# Patient Record
(demographics unavailable — no encounter records)

---

## 2025-03-04 NOTE — PLAN
[TextEntry] : - continue current medication regimen.  - follow up with Dr. Nicole for ongoing cardiology care.  - we have MICHAEL images; obtain cardiac MRI images and report from [facility]  - obtain cardiac CTA  - return to [facility] clinic after above images have been obtained for a MDT discussion with CT surgeon, Dr. Connelly and Dr. Hicks.   I, Dr. Shiva Hicks, personally performed the evaluation and management (E/M) services for this new patient. That E/M includes conducting the clinically appropriate initial history &/or exam, assessing all conditions, and establishing the plan of care. Today, my KUNAL, noted herewith, was here to observe my evaluation and management service for this patient & follow plan of care established by me going forward.

## 2025-03-04 NOTE — RESULTS/DATA
[TextEntry] : TTE 2/24/25 CONCLUSIONS:  1. Left ventricular cavity is normal in size. Left ventricular wall thickness is normal. Left ventricular systolic function is normal with an ejection fraction of 63 % by Watt's method of disks. There are no regional wall motion abnormalities seen.  2. Normal left ventricular diastolic function, with normal left ventricular filling pressure.  3. Mildly enlarged right ventricular cavity size and normal right ventricular systolic function.  4. Normal left and right atrial size.  5. There is a secundum atrial septal defect with bidirectional, but predominantly left-to-right shunting.  6. No significant valvular disease.  7. No pericardial effusion seen.  8. No prior echocardiogram is available for comparison.

## 2025-03-04 NOTE — PHYSICAL EXAM
[Well Developed] : well developed [Well Nourished] : well nourished [No Acute Distress] : no acute distress [Normal Conjunctiva] : normal conjunctiva [Normal S1, S2] : normal S1, S2 [No Rub] : no rub [No Gallop] : no gallop [Clear Lung Fields] : clear lung fields [Good Air Entry] : good air entry [No Respiratory Distress] : no respiratory distress  [Soft] : abdomen soft [Non Tender] : non-tender [Normal Bowel Sounds] : normal bowel sounds [Normal Gait] : normal gait [No Edema] : no edema [No Cyanosis] : no cyanosis [No Clubbing] : no clubbing [No Rash] : no rash [No Skin Lesions] : no skin lesions [Moves all extremities] : moves all extremities [No Focal Deficits] : no focal deficits [Normal Speech] : normal speech [Alert and Oriented] : alert and oriented [Normal memory] : normal memory [de-identified] : supple

## 2025-03-04 NOTE — REVIEW OF SYSTEMS
[Feeling Fatigued] : feeling fatigued [Joint Pain] : joint pain [Negative] : Heme/Lymph [Headache] : no headache [Fever] : no fever [Chills] : no chills [FreeTextEntry5] : see HPI  [de-identified] : see HPI

## 2025-03-04 NOTE — HISTORY OF PRESENT ILLNESS
[FreeTextEntry1] : Referred by Dr. Gregg Nicole  23M, former smoker, with PMH of anxiety/depression, chronic b/l leg pain/numbness 2/2 trauma (followed by pain management, and secundum ASD referred for consultation of treatment options for secundum ASD.   Denies prior CVA, MI/CAD, bleeding or clotting disorder.   Patient was referred to outpatient cardiologist after he was noted to have murmur on exam at PCP. Patient has been having progressive dyspnea on exertion for the past few years with mild LE edema, L sided sharp non-radiating chest pain at rest (not exertion) and occasional palpitations/dizziness.   TTE 11/1/24: EF 60%, RV moderately dilated, 2 large atrial septal defects with deficient anterior and inferior rims, secundum type with left to right shunt, trace TR/TX.   MICHAEL (done at [place])11/3/2023: secundum ASD w/ left to right shunt, ASD [id number], area 2.7cm2, anterior rim is deficient, inferior rim short 5-6mm. Pt was evaluated by structural heart clinic at [place] (Dr. Elier Pizano) on 2/28/24 where he was deemed to likely require surgical correction given 2 large ASD with deficient anterior and inferior rims. However, pt requested second opinion and thus followed up with Dr. Nicole. After reviewing imaging, pt was referred to structural heart team/Dr. Hicks for ASD repair.   Cardiac Cath 11/14/2024 Normal coronaries.  All pressures are in mmHg. RA 6 RV 48/3/12 PA 29/13; mPAP 20; PA sat 86.8% PCWP 6 LESLEY CO: 10.72 L/min LESLEY CI:6.13 L/min/m2SAT: IVC 91 %. HRA 83.4 %. SVC 81.3%. LRA 90.4% RA86% RV 89.7%. Qp:QS 1.6  11/14/24 labs reviewed:  WBC 7.20 H/H 16.3/48.9 Plt 191 BUN/Cr 13/0.78  Today, patient reports two years ago, he went for a physical and was told that there's something wrong with his heart and cardiology evaluation was recommended. 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 Currently, patient reports feeling well but has had increased fatigability over the course of a year which he attributes to his moods and his back pain. Reports had chest pain with SOB episode 2 months ago, went to the ED with unrevealing work up. Since he continues to have intermittent stabbing chest pain, self-limiting, goes away by itself after 5-10 minutes. Able to walk 45 minutes without issues. Has lower extremity when walking a lot but better in the am. Denies any recent shortness of breath, palpitations, lightheadedness/dizziness or syncope, orthopnea, PND, LE edema, abdominal bloating, fever, chills, night sweats, dysuria, active dental issue, or recent hospitalizations.

## 2025-03-04 NOTE — ASSESSMENT
[FreeTextEntry1] : 23M, former smoker, with PMH of anxiety/depression, chronic b/l leg pain/numbness 2/2 trauma (followed by pain management, and secundum ASD referred for consultation of treatment options for secundum ASD. Dr. Hicks has independently seen and evaluated the patient in this face-to-face encounter and has reviewed patient's history and pertinent clinical data. Patient is clinically stable, NYHA Class I. Echocardiogram today showed normal BiV function, mildly enlarged RV, no significant valvular disease, secundum ASD with bi-directional, but predominantly left to right shunting is noted. Results discuss with patient. The natural history/disease progression and indication to treat secundum ASD as well as treatment options including surgical and percutaneous approaches were discussed with patient at length. Given the presence of RV remodeling, Dr. Hicks recommends treatment of his ASD to prevent progressive RV remodeling. To determine the best treatment recommendation for patient, Dr. Hicks recommends obtaining MICHAEL and cardiac MRI images and reports from [place], as well as obtaining a cardiac CTA to better evaluate the patient's pulmonary vasculature and atrial septal defect. Once these images are obtained, the patient will return to our [facility] clinic for a multidisciplinary team discussion involving CT surgeon, Dr. Connelly, and Dr. Hicks. All questions were addressed.

## 2025-03-04 NOTE — REVIEW OF SYSTEMS
[Feeling Fatigued] : feeling fatigued [Joint Pain] : joint pain [Negative] : Heme/Lymph [Fever] : no fever [Headache] : no headache [Chills] : no chills [FreeTextEntry5] : see HPI  [de-identified] : see HPI

## 2025-03-04 NOTE — PHYSICAL EXAM
[Well Developed] : well developed [Well Nourished] : well nourished [No Acute Distress] : no acute distress [Normal Conjunctiva] : normal conjunctiva [Normal S1, S2] : normal S1, S2 [No Rub] : no rub [No Gallop] : no gallop [Clear Lung Fields] : clear lung fields [Good Air Entry] : good air entry [No Respiratory Distress] : no respiratory distress  [Soft] : abdomen soft [Non Tender] : non-tender [Normal Bowel Sounds] : normal bowel sounds [Normal Gait] : normal gait [No Edema] : no edema [No Cyanosis] : no cyanosis [No Clubbing] : no clubbing [No Rash] : no rash [No Skin Lesions] : no skin lesions [Moves all extremities] : moves all extremities [No Focal Deficits] : no focal deficits [Normal Speech] : normal speech [Alert and Oriented] : alert and oriented [Normal memory] : normal memory [de-identified] : supple

## 2025-03-04 NOTE — HISTORY OF PRESENT ILLNESS
[FreeTextEntry1] : Referred by Dr. Gregg Nicole  23M, former smoker, with PMH of anxiety/depression, chronic b/l leg pain/numbness 2/2 trauma (followed by pain management, and secundum ASD referred for consultation of treatment options for secundum ASD.   Denies prior CVA, MI/CAD, bleeding or clotting disorder.   Patient was referred to outpatient cardiologist after he was noted to have murmur on exam at PCP. Patient has been having progressive dyspnea on exertion for the past few years with mild LE edema, L sided sharp non-radiating chest pain at rest (not exertion) and occasional palpitations/dizziness.   TTE 11/1/24: EF 60%, RV moderately dilated, 2 large atrial septal defects with deficient anterior and inferior rims, secundum type with left to right shunt, trace TR/[place].   MICHAEL (done at [place])11/3/2023: secundum ASD w/ left to right shunt, ASD [id number], area 2.7cm2, anterior rim is deficient, inferior rim short 5-6mm. Pt was evaluated by structural heart clinic at [place] (Dr. Elier Pizano) on 2/28/24 where he was deemed to likely require surgical correction given 2 large ASD with deficient anterior and inferior rims. However, pt requested second opinion and thus followed up with Dr. Nicole. After reviewing imaging, pt was referred to structural heart team/Dr. Hicks for ASD repair.   Cardiac Cath 11/14/2024 Normal coronaries.  All pressures are in mmHg. RA 6 RV 48/3/12 PA 29/13; mPAP 20; PA sat 86.8% PCWP 6 LESLEY CO: 10.72 L/min LESLEY CI:6.13 L/min/m2SAT: IVC 91 %. HRA 83.4 %. SVC 81.3%. LRA 90.4% RA86% RV 89.7%. Qp:QS 1.6  11/14/24 labs reviewed:  WBC 7.20 H/H 16.3/48.9 Plt 191 BUN/Cr 13/0.78  Today, patient reports two years ago, he went for a physical and was told that there's something wrong with his heart and cardiology evaluation was recommended. 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 Currently, patient reports feeling well but has had increased fatigability over the course of a year which he attributes to his moods and his back pain. Reports had chest pain with SOB episode 2 months ago, went to the ED with unrevealing work up. Since he continues to have intermittent stabbing chest pain, self-limiting, goes away by itself after 5-10 minutes. Able to walk 45 minutes without issues. Has lower extremity when walking a lot but better in the am. Denies any recent shortness of breath, palpitations, lightheadedness/dizziness or syncope, orthopnea, PND, LE edema, abdominal bloating, fever, chills, night sweats, dysuria, active dental issue, or recent hospitalizations.

## 2025-03-04 NOTE — ASSESSMENT
[FreeTextEntry1] : 23M, former smoker, with PMH of anxiety/depression, chronic b/l leg pain/numbness 2/2 trauma (followed by pain management, and secundum ASD referred for consultation of treatment options for secundum ASD. Dr. Hicks has independently seen and evaluated the patient in this face-to-face encounter and has reviewed patient's history and pertinent clinical data. Patient is clinically stable, NYHA Class I. Echocardiogram today showed normal BiV function, mildly enlarged RV, no significant valvular disease, secundum ASD with bi-directional, but predominantly left to right shunting is noted. Results discuss with patient. The natural history/disease progression and indication to treat secundum ASD as well as treatment options including surgical and percutaneous approaches were discussed with patient at length. Given the presence of RV remodeling, Dr. Hicks recommends treatment of his ASD to prevent progressive RV remodeling. To determine the best treatment recommendation for patient, Dr. Hicks recommends obtaining MICHAEL and cardiac MRI images and reports from [place], as well as obtaining a cardiac CTA to better evaluate the patient's pulmonary vasculature and atrial septal defect. Once these images are obtained, the patient will return to our [facility] clinic for a multidisciplinary team discussion involving CT surgeon, Dr. Connelly, and Dr. Hicks. All questions were addressed.

## 2025-03-04 NOTE — HISTORY OF PRESENT ILLNESS
[FreeTextEntry1] : Referred by Dr. Gregg Nicole  23M, former smoker, with PMH of anxiety/depression, chronic b/l leg pain/numbness 2/2 trauma (followed by pain management, and secundum ASD referred for consultation of treatment options for secundum ASD.   Denies prior CVA, MI/CAD, bleeding or clotting disorder.   Patient was referred to outpatient cardiologist after he was noted to have murmur on exam at PCP. Patient has been having progressive dyspnea on exertion for the past few years with mild LE edema, L sided sharp non-radiating chest pain at rest (not exertion) and occasional palpitations/dizziness.   TTE 11/1/24: EF 60%, RV moderately dilated, 2 large atrial septal defects with deficient anterior and inferior rims, secundum type with left to right shunt, trace TR/[place].   MICHAEL (done at [place])11/3/2023: secundum ASD w/ left to right shunt, ASD [id number], area 2.7cm2, anterior rim is deficient, inferior rim short 5-6mm. Pt was evaluated by structural heart clinic at [place] (Dr. Elier Pizano) on 2/28/24 where he was deemed to likely require surgical correction given 2 large ASD with deficient anterior and inferior rims. However, pt requested second opinion and thus followed up with Dr. Nicole. After reviewing imaging, pt was referred to structural heart team/Dr. Hicks for ASD repair.   Cardiac Cath 11/14/2024 Normal coronaries.  All pressures are in mmHg. RA 6 RV 48/3/12 PA 29/13; mPAP 20; PA sat 86.8% PCWP 6 LESLEY CO: 10.72 L/min LESLEY CI:6.13 L/min/m2SAT: IVC 91 %. HRA 83.4 %. SVC 81.3%. LRA 90.4% RA86% RV 89.7%. Qp:QS 1.6  11/14/24 labs reviewed:  WBC 7.20 H/H 16.3/48.9 Plt 191 BUN/Cr 13/0.78  Today, patient reports two years ago, he went for a physical and was told that there's something wrong with his heart and cardiology evaluation was recommended. 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 Currently, patient reports feeling well but has had increased fatigability over the course of a year which he attributes to his moods and his back pain. Reports had chest pain with SOB episode 2 months ago, went to the ED with unrevealing work up. Since he continues to have intermittent stabbing chest pain, self-limiting, goes away by itself after 5-10 minutes. Able to walk 45 minutes without issues. Has lower extremity when walking a lot but better in the am. Denies any recent shortness of breath, palpitations, lightheadedness/dizziness or syncope, orthopnea, PND, LE edema, abdominal bloating, fever, chills, night sweats, dysuria, active dental issue, or recent hospitalizations.

## 2025-03-04 NOTE — PHYSICAL EXAM
[Well Developed] : well developed [Well Nourished] : well nourished [No Acute Distress] : no acute distress [Normal Conjunctiva] : normal conjunctiva [Normal S1, S2] : normal S1, S2 [No Rub] : no rub [No Gallop] : no gallop [Clear Lung Fields] : clear lung fields [Good Air Entry] : good air entry [No Respiratory Distress] : no respiratory distress  [Soft] : abdomen soft [Non Tender] : non-tender [Normal Bowel Sounds] : normal bowel sounds [Normal Gait] : normal gait [No Edema] : no edema [No Cyanosis] : no cyanosis [No Clubbing] : no clubbing [No Rash] : no rash [No Skin Lesions] : no skin lesions [Moves all extremities] : moves all extremities [No Focal Deficits] : no focal deficits [Normal Speech] : normal speech [Alert and Oriented] : alert and oriented [Normal memory] : normal memory [de-identified] : supple

## 2025-03-04 NOTE — PLAN
[TextEntry] : - continue current medication regimen.  - follow up with Dr. Nicole for ongoing cardiology care.  - we have MICHAEL images; obtain cardiac MRI images and report from [facility]  - obtain cardiac CTA  - return to [facility] clinic after above images have been obtained for a MDT discussion with CT surgeon, Dr. Connelly and Dr. Hicks.   I, Dr. Shiva Hicks, personally performed the evaluation and management (E/M) services for this new patient. That E/M includes conducting the clinically appropriate initial history &/or exam, assessing all conditions, and establishing the plan of care. Today, my KUNAL, noted herewith, was here to observe my evaluation and management service for this patient & follow plan of care established by me going forward.

## 2025-03-04 NOTE — PLAN
[TextEntry] : - continue current medication regimen.  - follow up with Dr. Nicole for ongoing cardiology care.  - we have MICHAEL images; obtain cardiac MRI images and report from [facility]  - obtain cardiac CTA  - return to [facility] clinic after above images have been obtained for a MDT discussion with CT surgeon, Dr. Connelly and Dr. Hicks.   I, Dr. Shiva Hicks, personally performed the evaluation and management (E/M) services for this new patient. That E/M includes conducting the clinically appropriate initial history &/or exam, assessing all conditions, and establishing the plan of care. Today, my KUNAL, noted herewith, was here to observe my evaluation and management service for this patient & follow plan of care established by me going forward.

## 2025-03-04 NOTE — PLAN
[TextEntry] : - continue current medication regimen.  - follow up with Dr. Nicole for ongoing cardiology care.  - we have MICHAEL images; obtain cardiac MRI images and report from [facility]  - obtain cardiac CTA  - return to [facility] clinic after above images have been obtained for a MDT discussion with CT surgeon, Dr. Connelly and Dr. Hicks.   I, Dr. Shiva Hicks, personally performed the evaluation and management (E/M) services for this new patient. That E/M includes conducting the clinically appropriate initial history &/or exam, assessing all conditions, and establishing the plan of care. Today, my KUNAL, noted herewith, was here to observe my evaluation and management service for this patient & follow plan of care established by me going forward.

## 2025-03-04 NOTE — ASSESSMENT
[FreeTextEntry1] : 23M, former smoker, with PMH of anxiety/depression, chronic b/l leg pain/numbness 2/2 trauma (followed by pain management, and secundum ASD referred for consultation of treatment options for secundum ASD. Dr. Hicks has independently seen and evaluated the patient in this face-to-face encounter and has reviewed patient's history and pertinent clinical data. Patient is clinically stable, NYHA Class I. Echocardiogram today showed normal BiV function, mildly enlarged RV, no significant valvular disease, secundum ASD with bi-directional, but predominantly left to right shunting is noted. Results discuss with patient. The natural history/disease progression and indication to treat secundum ASD as well as treatment options including surgical and percutaneous approaches were discussed with patient at length. Given the presence of RV remodeling, Dr. Hicks recommends treatment of his ASD to prevent progressive RV remodeling. To determine the best treatment recommendation for patient, Dr. Hicks recommends obtaining MICHAEL and cardiac MRI images and reports from [place], as well as obtaining a cardiac CTA to better evaluate the patient's pulmonary vasculature and atrial septal defect. Once these images are obtained, the patient will return to our [facility] clinic for a multidisciplinary team discussion involving CT surgeon, Dr. Connelly, and Dr. Hicks. All questions were addressed.

## 2025-03-04 NOTE — HISTORY OF PRESENT ILLNESS
[FreeTextEntry1] : Referred by Dr. Gregg Nicole  23M, former smoker, with PMH of anxiety/depression, chronic b/l leg pain/numbness 2/2 trauma (followed by pain management, and secundum ASD referred for consultation of treatment options for secundum ASD.   Denies prior CVA, MI/CAD, bleeding or clotting disorder.   Patient was referred to outpatient cardiologist after he was noted to have murmur on exam at PCP. Patient has been having progressive dyspnea on exertion for the past few years with mild LE edema, L sided sharp non-radiating chest pain at rest (not exertion) and occasional palpitations/dizziness.   TTE 11/1/24: EF 60%, RV moderately dilated, 2 large atrial septal defects with deficient anterior and inferior rims, secundum type with left to right shunt, trace TR/IN.   MICHAEL (done at [place])11/3/2023: secundum ASD w/ left to right shunt, ASD [id number], area 2.7cm2, anterior rim is deficient, inferior rim short 5-6mm. Pt was evaluated by structural heart clinic at [place] (Dr. Elier Pizano) on 2/28/24 where he was deemed to likely require surgical correction given 2 large ASD with deficient anterior and inferior rims. However, pt requested second opinion and thus followed up with Dr. Nicole. After reviewing imaging, pt was referred to structural heart team/Dr. Hicks for ASD repair.   Cardiac Cath 11/14/2024 Normal coronaries.  All pressures are in mmHg. RA 6 RV 48/3/12 PA 29/13; mPAP 20; PA sat 86.8% PCWP 6 LESLEY CO: 10.72 L/min LESLEY CI:6.13 L/min/m2SAT: IVC 91 %. HRA 83.4 %. SVC 81.3%. LRA 90.4% RA86% RV 89.7%. Qp:QS 1.6  11/14/24 labs reviewed:  WBC 7.20 H/H 16.3/48.9 Plt 191 BUN/Cr 13/0.78  Today, patient reports two years ago, he went for a physical and was told that there's something wrong with his heart and cardiology evaluation was recommended. 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 Currently, patient reports feeling well but has had increased fatigability over the course of a year which he attributes to his moods and his back pain. Reports had chest pain with SOB episode 2 months ago, went to the ED with unrevealing work up. Since he continues to have intermittent stabbing chest pain, self-limiting, goes away by itself after 5-10 minutes. Able to walk 45 minutes without issues. Has lower extremity when walking a lot but better in the am. Denies any recent shortness of breath, palpitations, lightheadedness/dizziness or syncope, orthopnea, PND, LE edema, abdominal bloating, fever, chills, night sweats, dysuria, active dental issue, or recent hospitalizations.

## 2025-03-04 NOTE — ASSESSMENT
[FreeTextEntry1] : 23M, former smoker, with PMH of anxiety/depression, chronic b/l leg pain/numbness 2/2 trauma (followed by pain management, and secundum ASD referred for consultation of treatment options for secundum ASD. Dr. Hicks has independently seen and evaluated the patient in this face-to-face encounter and has reviewed patient's history and pertinent clinical data. Patient is clinically stable, NYHA Class I. Echocardiogram today showed normal BiV function, mildly enlarged RV, no significant valvular disease, secundum ASD with bi-directional, but predominantly left to right shunting is noted. Results discuss with patient. The natural history/disease progression and indication to treat secundum ASD as well as treatment options including surgical and percutaneous approaches were discussed with patient at length. Given the presence of RV remodeling, Dr. Hicks recommends treatment of his ASD to prevent progressive RV remodeling. To determine the best treatment recommendation for patient, Dr. Hicks recommends obtaining MICHAEL and cardiac MRI images and reports from [place], as well as obtaining a cardiac CTA to better evaluate the patient's pulmonary vasculature and atrial septal defect. Once these images are obtained, the patient will return to our [facility] clinic for a multidisciplinary team discussion involving CT surgeon, Dr. Connelly, and Dr. Hicks. All questions were addressed.

## 2025-03-04 NOTE — REVIEW OF SYSTEMS
[Feeling Fatigued] : feeling fatigued [Joint Pain] : joint pain [Negative] : Heme/Lymph [Headache] : no headache [Fever] : no fever [Chills] : no chills [FreeTextEntry5] : see HPI  [de-identified] : see HPI

## 2025-03-04 NOTE — PHYSICAL EXAM
[Well Developed] : well developed [Well Nourished] : well nourished [No Acute Distress] : no acute distress [Normal Conjunctiva] : normal conjunctiva [Normal S1, S2] : normal S1, S2 [No Rub] : no rub [No Gallop] : no gallop [Clear Lung Fields] : clear lung fields [Good Air Entry] : good air entry [No Respiratory Distress] : no respiratory distress  [Soft] : abdomen soft [Non Tender] : non-tender [Normal Bowel Sounds] : normal bowel sounds [Normal Gait] : normal gait [No Edema] : no edema [No Cyanosis] : no cyanosis [No Clubbing] : no clubbing [No Rash] : no rash [No Skin Lesions] : no skin lesions [Moves all extremities] : moves all extremities [No Focal Deficits] : no focal deficits [Normal Speech] : normal speech [Alert and Oriented] : alert and oriented [Normal memory] : normal memory [de-identified] : supple

## 2025-04-15 NOTE — REVIEW OF SYSTEMS
[Chest Pain] : chest pain [Palpitations] : palpitations [SOB on Exertion] : shortness of breath during exertion [Dizziness] : dizziness [Negative] : Heme/Lymph

## 2025-04-16 NOTE — ASSESSMENT
[FreeTextEntry1] : 24 yo male, former smoker with PMH of anxiety/depression and chronic b/l leg pain/numbness 2/2 trauma presents with secundum ASD. Dr. Connelly reviewed the cardiac cath images and echocardiogram images with the patient and    discussed the case with Dr. Hicks and Dr. Nicole. Dr. Connelly performed the STS risk calculator and quoted a <1% operative mortality and complication risk and discussed the STS risk factors with the patient including but not limited to death, heart attack, bleeding, stroke, kidney problems and infection. Dr. Connelly also discussed surgical approaches, minimally invasive versus sternotomy.   Dr. Connelly feels the patient will benefit from and is a candidate for Robotic, ASD closure.  All questions were addressed and the patient agrees to proceed with surgery.   Plan:  PST--labs, xray, u/a Blood drawn in office and reviewed in [facility] ERP instructions provided re antibacterial showers and pt given 3 sponges pt instructed on use of the incentive spirometer

## 2025-04-16 NOTE — HISTORY OF PRESENT ILLNESS
[FreeTextEntry1] : 24 yo  Greek speaking male, former smoker with PMH of anxiety/depression and chronic b/l leg pain/numbness 2/2 trauma (followed by pain management) presents with secundum ASD. Patient followed by Dr. Gregg Nicole. Patient was initially referred to outpatient cardiologist after he was noted to have murmur on exam at [facility] 2 years ago. Patient has been having progressive dyspnea on exertion for the past few years with mild LE edema, L sided sharp non-radiating chest pain at rest (not exertion) and occasional palpitations/dizziness. TTE 11/1/24: EF 60%, RV moderately dilated, 2 large atrial septal defects with deficient anterior and inferior rims, secundum type with left to right shunt, trace TR/OH. MICHAEL (done at [place])11/3/2023: secundum ASD w/ left to right shunt, ASD [id number], area 2.7cm2, anterior rim is deficient, inferior rim short 5-6mm. Pt was evaluated by structural heart clinic at [place] (Dr. Elier Pizano) on 2/28/24 where he was deemed to likely require surgical correction given 2 large ASD with deficient anterior and inferior rims. However, pt requested second opinion and thus followed up with Dr. Nicole. After reviewing imaging, pt was referred to structural heart team/Dr. Hicks for ASD repair.  Patient presents for surgical consult with Dr. Connelly is unaccompanied for visit today. He lives in [facility] with his brother. Currently not working.

## 2025-04-16 NOTE — END OF VISIT
[Time Spent: ___ minutes] : I have spent [unfilled] minutes of time on the encounter which excludes teaching and separately reported services. [FreeTextEntry3] :   I, Dr. BONE [facility], personally performed the evaluation and management (E/M) services for this new patient.  That E/M includes conducting the clinically appropriate initial history &/or exam, assessing all conditions, and establishing the plan of care.  Today, my KUNAL, was here to observe &/or participate in the visit & follow plan of care established by me.

## 2025-04-16 NOTE — HISTORY OF PRESENT ILLNESS
[FreeTextEntry1] : 22 yo  Khmer speaking male, former smoker with PMH of anxiety/depression and chronic b/l leg pain/numbness 2/2 trauma (followed by pain management) presents with secundum ASD. Patient followed by Dr. Gregg Nicole. Patient was initially referred to outpatient cardiologist after he was noted to have murmur on exam at [facility] 2 years ago. Patient has been having progressive dyspnea on exertion for the past few years with mild LE edema, L sided sharp non-radiating chest pain at rest (not exertion) and occasional palpitations/dizziness. TTE 11/1/24: EF 60%, RV moderately dilated, 2 large atrial septal defects with deficient anterior and inferior rims, secundum type with left to right shunt, trace TR/GA. MICHAEL (done at [place])11/3/2023: secundum ASD w/ left to right shunt, ASD [id number], area 2.7cm2, anterior rim is deficient, inferior rim short 5-6mm. Pt was evaluated by structural heart clinic at [place] (Dr. Elier Pizano) on 2/28/24 where he was deemed to likely require surgical correction given 2 large ASD with deficient anterior and inferior rims. However, pt requested second opinion and thus followed up with Dr. Nicole. After reviewing imaging, pt was referred to structural heart team/Dr. Hicks for ASD repair.  Patient presents for surgical consult with Dr. Connelly is unaccompanied for visit today. He lives in [facility] with his brother. Currently not working.

## 2025-04-16 NOTE — END OF VISIT
[Time Spent: ___ minutes] : I have spent [unfilled] minutes of time on the encounter which excludes teaching and separately reported services. [FreeTextEntry3] :   I, Dr. BONE [facility], personally performed the evaluation and management (E/M) services for this new patient.  That E/M includes conducting the clinically appropriate initial history &/or exam, assessing all conditions, and establishing the plan of care.  Today, my KUNAL, was here to observe &/or participate in the visit & follow plan of care established by me.

## 2025-04-16 NOTE — REASON FOR VISIT
[Other: ______] : provided by YAEL [Interpreters_IDNumber] : [id number] [Interpreters_FullName] : Qasim [TWNoteComboBox1] : Surinamese

## 2025-04-16 NOTE — ASSESSMENT
[FreeTextEntry1] : 22 yo male, former smoker with PMH of anxiety/depression and chronic b/l leg pain/numbness 2/2 trauma presents with secundum ASD. Dr. Connelly reviewed the cardiac cath images and echocardiogram images with the patient and    discussed the case with Dr. Hicks and Dr. Nicole. Dr. Connelly performed the STS risk calculator and quoted a <1% operative mortality and complication risk and discussed the STS risk factors with the patient including but not limited to death, heart attack, bleeding, stroke, kidney problems and infection. Dr. Connelly also discussed surgical approaches, minimally invasive versus sternotomy.   Dr. Connelly feels the patient will benefit from and is a candidate for Robotic, ASD closure.  All questions were addressed and the patient agrees to proceed with surgery.   Plan:  PST--labs, xray, u/a Blood drawn in office and reviewed in [facility] SDA ERP instructions provided re antibacterial showers and pt given 3 sponges pt instructed on use of the incentive spirometer

## 2025-04-16 NOTE — PHYSICAL EXAM
[General Appearance - Alert] : alert [General Appearance - In No Acute Distress] : in no acute distress [Sclera] : the sclera and conjunctiva were normal [PERRL With Normal Accommodation] : pupils were equal in size, round, and reactive to light [Extraocular Movements] : extraocular movements were intact [Outer Ear] : the ears and nose were normal in appearance [Oropharynx] : the oropharynx was normal [Neck Appearance] : the appearance of the neck was normal [Neck Cervical Mass (___cm)] : no neck mass was observed [Jugular Venous Distention Increased] : there was no jugular-venous distention [Thyroid Diffuse Enlargement] : the thyroid was not enlarged [Thyroid Nodule] : there were no palpable thyroid nodules [Auscultation Breath Sounds / Voice Sounds] : lungs were clear to auscultation bilaterally [Heart Rate And Rhythm] : heart rate was normal and rhythm regular [Heart Sounds] : normal S1 and S2 [Heart Sounds Gallop] : no gallops [Examination Of The Chest] : the chest was normal in appearance [Chest Visual Inspection Thoracic Asymmetry] : no chest asymmetry [Diminished Respiratory Excursion] : normal chest expansion [2+] : left 2+ [No Abnormalities] : the abdominal aorta was not enlarged and no bruit was heard [Bowel Sounds] : normal bowel sounds [Abdomen Soft] : soft [Abdomen Tenderness] : non-tender [Abdomen Mass (___ Cm)] : no abdominal mass palpated [No CVA Tenderness] : no ~M costovertebral angle tenderness [No Spinal Tenderness] : no spinal tenderness [Abnormal Walk] : normal gait [Nail Clubbing] : no clubbing  or cyanosis of the fingernails [Musculoskeletal - Swelling] : no joint swelling seen [Motor Tone] : muscle strength and tone were normal [Skin Color & Pigmentation] : normal skin color and pigmentation [Skin Turgor] : normal skin turgor [] : no rash [Deep Tendon Reflexes (DTR)] : deep tendon reflexes were 2+ and symmetric [Sensation] : the sensory exam was normal to light touch and pinprick [No Focal Deficits] : no focal deficits [Oriented To Time, Place, And Person] : oriented to person, place, and time [Impaired Insight] : insight and judgment were intact [Affect] : the affect was normal [Right Carotid Bruit] : no bruit heard over the right carotid [Left Carotid Bruit] : no bruit heard over the left carotid [Right Femoral Bruit] : no bruit heard over the right femoral artery [Left Femoral Bruit] : no bruit heard over the left femoral artery

## 2025-04-16 NOTE — REASON FOR VISIT
[Other: ______] : provided by YAEL [Interpreters_IDNumber] : [id number] [Interpreters_FullName] : Qasim [TWNoteComboBox1] : Maldivian

## 2025-04-16 NOTE — DATA REVIEWED
[FreeTextEntry1] : 2/24/25 TTE w/bubble study: 1. Left ventricular cavity is normal in size. Left ventricular wall thickness is normal. Left ventricular systolic function is normal with an ejection fraction of 63 % by Watt's method of disks. There are no regional wall motion abnormalities seen. 2. Normal left ventricular diastolic function, with normal left ventricular filling pressure. 3. Mildly enlarged right ventricular cavity size and normal right ventricular systolic function. 4. Normal left and right atrial size. 5. There is a secundum atrial septal defect with bidirectional, but predominantly left-to-right shunting. 6. No significant valvular disease. 7. No pericardial effusion seen. 8. No prior echocardiogram is available for comparison.  3/31/25 CTA [facility]:  1. Please note this study was not optimized for the evaluation of the coronary arteries. 2. Nonobstructive coronary artery disease. 3. Secundum type atrial septal defect. 4. No anomalous pulmonary venous return.  Cardiac Cath 11/14/2024 Normal coronaries. All pressures are in mmHg. RA 6 RV 48/3/12 PA 29/13; mPAP 20; PA sat 86.8% PCWP 6 LESLEY CO: 10.72 L/min LESLEY CI:6.13 L/min/m2SAT: IVC 91 %. HRA 83.4 %. SVC 81.3%. LRA 90.4% RA86% RV 89.7%. Qp:QS 1.6

## 2025-04-16 NOTE — REASON FOR VISIT
[Other: ______] : provided by YAEL [Interpreters_IDNumber] : [id number] [Interpreters_FullName] : Qasim [TWNoteComboBox1] : Congolese

## 2025-04-16 NOTE — HISTORY OF PRESENT ILLNESS
[FreeTextEntry1] : 22 yo  Syriac speaking male, former smoker with PMH of anxiety/depression and chronic b/l leg pain/numbness 2/2 trauma (followed by pain management) presents with secundum ASD. Patient followed by Dr. Gregg Nicole. Patient was initially referred to outpatient cardiologist after he was noted to have murmur on exam at [facility] 2 years ago. Patient has been having progressive dyspnea on exertion for the past few years with mild LE edema, L sided sharp non-radiating chest pain at rest (not exertion) and occasional palpitations/dizziness. TTE 11/1/24: EF 60%, RV moderately dilated, 2 large atrial septal defects with deficient anterior and inferior rims, secundum type with left to right shunt, trace TR/WI. MICHAEL (done at [place])11/3/2023: secundum ASD w/ left to right shunt, ASD [id number], area 2.7cm2, anterior rim is deficient, inferior rim short 5-6mm. Pt was evaluated by structural heart clinic at [place] (Dr. Elier Pizano) on 2/28/24 where he was deemed to likely require surgical correction given 2 large ASD with deficient anterior and inferior rims. However, pt requested second opinion and thus followed up with Dr. Nicole. After reviewing imaging, pt was referred to structural heart team/Dr. Hicks for ASD repair.  Patient presents for surgical consult with Dr. Connelly is unaccompanied for visit today. He lives in [facility] with his brother. Currently not working.

## 2025-04-16 NOTE — END OF VISIT
[Time Spent: ___ minutes] : I have spent [unfilled] minutes of time on the encounter which excludes teaching and separately reported services. [FreeTextEntry3] :   I, Dr. BONE [facility], personally performed the evaluation and management (E/M) services for this new patient.  That E/M includes conducting the clinically appropriate initial history &/or exam, assessing all conditions, and establishing the plan of care.  Today, my KUNAL, was here to observe &/or participate in the visit & follow plan of care established by me.

## 2025-04-16 NOTE — PHYSICAL EXAM
[General Appearance - Alert] : alert [General Appearance - In No Acute Distress] : in no acute distress [Sclera] : the sclera and conjunctiva were normal [PERRL With Normal Accommodation] : pupils were equal in size, round, and reactive to light [Extraocular Movements] : extraocular movements were intact [Outer Ear] : the ears and nose were normal in appearance [Oropharynx] : the oropharynx was normal [Neck Appearance] : the appearance of the neck was normal [Neck Cervical Mass (___cm)] : no neck mass was observed [Jugular Venous Distention Increased] : there was no jugular-venous distention [Thyroid Diffuse Enlargement] : the thyroid was not enlarged [Thyroid Nodule] : there were no palpable thyroid nodules [Auscultation Breath Sounds / Voice Sounds] : lungs were clear to auscultation bilaterally [Heart Rate And Rhythm] : heart rate was normal and rhythm regular [Heart Sounds] : normal S1 and S2 [Heart Sounds Gallop] : no gallops [Examination Of The Chest] : the chest was normal in appearance [Chest Visual Inspection Thoracic Asymmetry] : no chest asymmetry [Diminished Respiratory Excursion] : normal chest expansion [2+] : left 2+ [No Abnormalities] : the abdominal aorta was not enlarged and no bruit was heard [Bowel Sounds] : normal bowel sounds [Abdomen Soft] : soft [Abdomen Tenderness] : non-tender [Abdomen Mass (___ Cm)] : no abdominal mass palpated [No CVA Tenderness] : no ~M costovertebral angle tenderness [No Spinal Tenderness] : no spinal tenderness [Abnormal Walk] : normal gait [Nail Clubbing] : no clubbing  or cyanosis of the fingernails [Musculoskeletal - Swelling] : no joint swelling seen [Motor Tone] : muscle strength and tone were normal [Skin Color & Pigmentation] : normal skin color and pigmentation [Skin Turgor] : normal skin turgor [] : no rash [Deep Tendon Reflexes (DTR)] : deep tendon reflexes were 2+ and symmetric [Sensation] : the sensory exam was normal to light touch and pinprick [No Focal Deficits] : no focal deficits [Oriented To Time, Place, And Person] : oriented to person, place, and time [Impaired Insight] : insight and judgment were intact [Affect] : the affect was normal [Left Carotid Bruit] : no bruit heard over the left carotid [Right Carotid Bruit] : no bruit heard over the right carotid [Right Femoral Bruit] : no bruit heard over the right femoral artery [Left Femoral Bruit] : no bruit heard over the left femoral artery

## 2025-04-22 NOTE — PLAN
[TextEntry] : - continue current medications.  - continue close follow up with Dr. Nicole for ongoing cardiology care and management.  - CTS consultation with Dr. Connelly today.  - RTC as needed.   I, Dr. Shiva Hicks, personally performed the evaluation and management (E/M) services for this established patient who presents today with (a) new problem(s)/exacerbation of (an) existing condition(s). That E/M includes conducting the clinically appropriate interval history &/or exam, assessing all new/exacerbated conditions, and establishing a new plan of care. Today, my KUNAL, noted herewith, was here to observe my evaluation and management service for this new problem/exacerbated condition and follow the plan of care established by me going forward.

## 2025-04-22 NOTE — ASSESSMENT
[FreeTextEntry1] : 23M, former smoker, with PMH of anxiety/depression, chronic b/l leg pain/numbness 2/2 trauma (followed by pain management, and secundum ASD, who presents for follow up after testing. Dr. Hicks has independently seen and evaluated the patient in this face-to-face encounter and has reviewed patient's history and pertinent clinical data. Patient is clinically stable, NYHA Class I with no overt signs of volume overload. Dr. Hicks reviewed patient Cardiac CTA and MICHAEL that was performed in [place] (11/3/2023). MICHAEL showed normal BiV function, dilated RV, large secundum ASD with two areas of deficient rims. Results discussed with patient using a . Given the patient's ASD morphology, which includes a large size and deficient rims, percutaneous ASD closure presents challenges with a success rate of ~40-50%. Additionally, there is a ~50-60% probability that the patient may ultimately require cardiac surgery. Dr. Hicks recommends proceeding with a cardiothoracic surgery consultation with Dr. Connelly for possible robotic ASD closure. The patient verbalized understanding and wishes to proceed with surgical consultation. All questions have been addressed.

## 2025-04-22 NOTE — ASSESSMENT
[FreeTextEntry1] : 23M, former smoker, with PMH of anxiety/depression, chronic b/l leg pain/numbness 2/2 trauma (followed by pain management, and secundum ASD, who presents for follow up after testing. Dr. Hicks has independently seen and evaluated the patient in this face-to-face encounter and has reviewed patient's history and pertinent clinical data. Patient is clinically stable, NYHA Class I with no overt signs of volume overload. Dr. Hicks reviewed patient Cardiac CTA and MICHAEL that was performed in [place] (11/3/2023). MICHAEL showed normal BiV function, dilated RV, large secundum ASD with two areas of deficient rims. Results discussed with patient using a . Given the patient's ASD morphology, which includes a large size and deficient rims, percutaneous ASD closure presents challenges with a success rate of ~40-50%. Additionally, there is a ~50-60% probability that the patient may ultimately require cardiac surgery. Dr. Hicks recommends proceeding with a cardiothoracic surgery consultation with Dr. Connelly for possible robotic ASD closure. The patient verbalized understanding and wishes to proceed with surgical consultation. All questions have been addressed.

## 2025-04-22 NOTE — REVIEW OF SYSTEMS
[Feeling Fatigued] : feeling fatigued [Joint Pain] : joint pain [Negative] : Heme/Lymph [Fever] : no fever [Headache] : no headache [Chills] : no chills [FreeTextEntry5] : see HPI  [de-identified] : see HPI

## 2025-04-22 NOTE — REVIEW OF SYSTEMS
[Feeling Fatigued] : feeling fatigued [Joint Pain] : joint pain [Negative] : Heme/Lymph [Fever] : no fever [Headache] : no headache [Chills] : no chills [FreeTextEntry5] : see HPI  [de-identified] : see HPI

## 2025-04-22 NOTE — REASON FOR VISIT
[Structural Heart and Valve Disease] : structural heart and valve disease [Time Spent: ____ minutes] : Total time spent using  services: [unfilled] minutes. The patient's primary language is not English thus required  services. [Interpreters_IDNumber] : [id number] [Interpreters_FullName] : Jennifer [TWNoteComboBox1] : Vincentian

## 2025-04-22 NOTE — REASON FOR VISIT
[Structural Heart and Valve Disease] : structural heart and valve disease [Time Spent: ____ minutes] : Total time spent using  services: [unfilled] minutes. The patient's primary language is not English thus required  services. [Interpreters_IDNumber] : [id number] [Interpreters_FullName] : Jennifer [TWNoteComboBox1] : Liberian

## 2025-04-22 NOTE — PHYSICAL EXAM
[Well Developed] : well developed [Well Nourished] : well nourished [No Acute Distress] : no acute distress [Normal Conjunctiva] : normal conjunctiva [Normal S1, S2] : normal S1, S2 [No Rub] : no rub [No Gallop] : no gallop [Clear Lung Fields] : clear lung fields [Good Air Entry] : good air entry [No Respiratory Distress] : no respiratory distress  [Soft] : abdomen soft [Non Tender] : non-tender [Normal Bowel Sounds] : normal bowel sounds [Normal Gait] : normal gait [No Edema] : no edema [No Cyanosis] : no cyanosis [No Clubbing] : no clubbing [No Rash] : no rash [No Skin Lesions] : no skin lesions [Moves all extremities] : moves all extremities [No Focal Deficits] : no focal deficits [Normal Speech] : normal speech [Alert and Oriented] : alert and oriented [Normal memory] : normal memory [de-identified] : supple

## 2025-04-22 NOTE — HISTORY OF PRESENT ILLNESS
[FreeTextEntry1] : Referred by Dr. Gregg Nicole  23M, former smoker, with PMH of anxiety/depression, chronic b/l leg pain/numbness 2/2 trauma (followed by pain management, and secundum ASD, who presents for follow up after testing.   Denies prior CVA, MI/CAD, bleeding or clotting disorder.   Patient was referred to outpatient cardiologist after he was noted to have murmur on exam at [facility]. Patient has been having progressive dyspnea on exertion for the past few years with mild LE edema, L sided sharp non-radiating chest pain at rest (not exertion) and occasional palpitations/dizziness.   TTE 11/1/24: EF 60%, RV moderately dilated, 2 large atrial septal defects with deficient anterior and inferior rims, secundum type with left to right shunt, trace TR/NM.   MICHAEL (done at [place])11/3/2023: secundum ASD w/ left to right shunt, ASD [id number], area 2.7cm2, anterior rim is deficient, inferior rim short 5-6mm. Pt was evaluated by structural heart clinic at [place] (Dr. Elier Pizano) on 2/28/24 where he was deemed to likely require surgical correction given 2 large ASD with deficient anterior and inferior rims. However, pt requested second opinion and thus followed up with Dr. Nicole. After reviewing imaging, pt was referred to structural heart team/Dr. Hicks for ASD repair.   Cardiac Cath 11/14/2024 Normal coronaries.  All pressures are in mmHg. RA 6 RV 48/3/12 PA 29/13; mPAP 20; PA sat 86.8% PCWP 6 LESLEY CO: 10.72 L/min LESLEY CI:6.13 L/min/m2SAT: IVC 91 %. HRA 83.4 %. SVC 81.3%. LRA 90.4% RA86% RV 89.7%. Qp:QS 1.6  Seen in [facility] clinic on 2/24/25 for initial consultation at which time patient reported that he was diagnosed with his ASD approximately two years ago during routine physical. 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 During the visit, the patient reported feeling well but has had increased fatigability over the course of a year which he attributed to his moods and his back pain. Reported that he had chest pain with SOB episode 2 months ago, went to the ED with unrevealing work up. Since he continues to have intermittent stabbing chest pain, self-limiting, goes away by itself after 5-10 minutes. Able to walk 45 minutes without issues. Noted some lower extremity edema when walking a lot but better in the am. Echocardiogram on the day of visit showed normal BiV function, mildly enlarged RV, no significant valvular disease, secundum ASD with bi-directional, but predominantly left to right shunting is noted and given RV remodeling, ASD closure was recommended. Dr. Hicks recommended obtaining MICHAEL images from [place], and Cardiac CTA. As it turns out, patient never had cardiac MRI done at [place].   2/24/25 labs:  WBC 7.20 H/H 16.3/48.1 Plt 202 BUN/Cr 10/0.59 ProBNP 49  CTA Cardiac 2/24/25 Cardiac: 1.  Please note this study was not optimized for the evaluation of the coronary arteries. 2.  Nonobstructive coronary artery disease. 3.  Secundum type atrial septal defect. 4.  No anomalous pulmonary venous return.  Today, patient reports feeling well, lives with his brother in an apartment. Continues to have back pain, waiting for another round of injection for pain control. No longer experiencing chest pain but continues to have intermittent shortness of breath when he gets tired. Still able to walk a lot with no issue. Stairs is challenging because of bilateral LE weakness that is related from his accident. Continues to have dependent BLE edema. Denies any recent chest pain, worsening shortness of breath, palpitations, lightheadedness/dizziness or syncope, orthopnea, PND, LE edema, abdominal bloating, fever, chills, dysuria, active dental issue, or recent hospitalizations.

## 2025-04-22 NOTE — PHYSICAL EXAM
[Well Developed] : well developed [Well Nourished] : well nourished [No Acute Distress] : no acute distress [Normal Conjunctiva] : normal conjunctiva [Normal S1, S2] : normal S1, S2 [No Rub] : no rub [No Gallop] : no gallop [Clear Lung Fields] : clear lung fields [Good Air Entry] : good air entry [No Respiratory Distress] : no respiratory distress  [Soft] : abdomen soft [Non Tender] : non-tender [Normal Bowel Sounds] : normal bowel sounds [Normal Gait] : normal gait [No Edema] : no edema [No Cyanosis] : no cyanosis [No Clubbing] : no clubbing [No Rash] : no rash [No Skin Lesions] : no skin lesions [Moves all extremities] : moves all extremities [No Focal Deficits] : no focal deficits [Normal Speech] : normal speech [Alert and Oriented] : alert and oriented [Normal memory] : normal memory [de-identified] : supple

## 2025-04-22 NOTE — HISTORY OF PRESENT ILLNESS
[FreeTextEntry1] : Referred by Dr. Gregg Nicole  23M, former smoker, with PMH of anxiety/depression, chronic b/l leg pain/numbness 2/2 trauma (followed by pain management, and secundum ASD, who presents for follow up after testing.   Denies prior CVA, MI/CAD, bleeding or clotting disorder.   Patient was referred to outpatient cardiologist after he was noted to have murmur on exam at PCP. Patient has been having progressive dyspnea on exertion for the past few years with mild LE edema, L sided sharp non-radiating chest pain at rest (not exertion) and occasional palpitations/dizziness.   TTE 11/1/24: EF 60%, RV moderately dilated, 2 large atrial septal defects with deficient anterior and inferior rims, secundum type with left to right shunt, trace TR/AR.   MICHAEL (done at [place])11/3/2023: secundum ASD w/ left to right shunt, ASD [id number], area 2.7cm2, anterior rim is deficient, inferior rim short 5-6mm. Pt was evaluated by structural heart clinic at [place] (Dr. Elier Pizano) on 2/28/24 where he was deemed to likely require surgical correction given 2 large ASD with deficient anterior and inferior rims. However, pt requested second opinion and thus followed up with Dr. Nicole. After reviewing imaging, pt was referred to structural heart team/Dr. Hicks for ASD repair.   Cardiac Cath 11/14/2024 Normal coronaries.  All pressures are in mmHg. RA 6 RV 48/3/12 PA 29/13; mPAP 20; PA sat 86.8% PCWP 6 LESLEY CO: 10.72 L/min LESLEY CI:6.13 L/min/m2SAT: IVC 91 %. HRA 83.4 %. SVC 81.3%. LRA 90.4% RA86% RV 89.7%. Qp:QS 1.6  Seen in [facility] on 2/24/25 for initial consultation at which time patient reported that he was diagnosed with his ASD approximately two years ago during routine physical. 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 During the visit, the patient reported feeling well but has had increased fatigability over the course of a year which he attributed to his moods and his back pain. Reported that he had chest pain with SOB episode 2 months ago, went to the ED with unrevealing work up. Since he continues to have intermittent stabbing chest pain, self-limiting, goes away by itself after 5-10 minutes. Able to walk 45 minutes without issues. Noted some lower extremity edema when walking a lot but better in the am. Echocardiogram on the day of visit showed normal BiV function, mildly enlarged RV, no significant valvular disease, secundum ASD with bi-directional, but predominantly left to right shunting is noted and given RV remodeling, ASD closure was recommended. Dr. Hicks recommended obtaining MICHAEL images from [place], and Cardiac CTA. As it turns out, patient never had cardiac MRI done at [place].   2/24/25 labs:  WBC 7.20 H/H 16.3/48.1 Plt 202 BUN/Cr 10/0.59 ProBNP 49  CTA Cardiac 2/24/25 Cardiac: 1.  Please note this study was not optimized for the evaluation of the coronary arteries. 2.  Nonobstructive coronary artery disease. 3.  Secundum type atrial septal defect. 4.  No anomalous pulmonary venous return.  Today, patient reports feeling well, lives with his brother in an apartment. Continues to have back pain, waiting for another round of injection for pain control. No longer experiencing chest pain but continues to have intermittent shortness of breath when he gets tired. Still able to walk a lot with no issue. Stairs is challenging because of bilateral LE weakness that is related from his accident. Continues to have dependent BLE edema. Denies any recent chest pain, worsening shortness of breath, palpitations, lightheadedness/dizziness or syncope, orthopnea, PND, LE edema, abdominal bloating, fever, chills, dysuria, active dental issue, or recent hospitalizations.

## 2025-04-22 NOTE — PHYSICAL EXAM
[Well Developed] : well developed [Well Nourished] : well nourished [No Acute Distress] : no acute distress [Normal Conjunctiva] : normal conjunctiva [Normal S1, S2] : normal S1, S2 [No Rub] : no rub [No Gallop] : no gallop [Clear Lung Fields] : clear lung fields [Good Air Entry] : good air entry [No Respiratory Distress] : no respiratory distress  [Soft] : abdomen soft [Non Tender] : non-tender [Normal Bowel Sounds] : normal bowel sounds [Normal Gait] : normal gait [No Edema] : no edema [No Cyanosis] : no cyanosis [No Clubbing] : no clubbing [No Rash] : no rash [No Skin Lesions] : no skin lesions [Moves all extremities] : moves all extremities [No Focal Deficits] : no focal deficits [Normal Speech] : normal speech [Alert and Oriented] : alert and oriented [Normal memory] : normal memory [de-identified] : supple

## 2025-04-22 NOTE — HISTORY OF PRESENT ILLNESS
[FreeTextEntry1] : Referred by Dr. Gregg Nicole  23M, former smoker, with PMH of anxiety/depression, chronic b/l leg pain/numbness 2/2 trauma (followed by pain management, and secundum ASD, who presents for follow up after testing.   Denies prior CVA, MI/CAD, bleeding or clotting disorder.   Patient was referred to outpatient cardiologist after he was noted to have murmur on exam at PCP. Patient has been having progressive dyspnea on exertion for the past few years with mild LE edema, L sided sharp non-radiating chest pain at rest (not exertion) and occasional palpitations/dizziness.   TTE 11/1/24: EF 60%, RV moderately dilated, 2 large atrial septal defects with deficient anterior and inferior rims, secundum type with left to right shunt, trace TR/NV.   MICHAEL (done at [place])11/3/2023: secundum ASD w/ left to right shunt, ASD [id number], area 2.7cm2, anterior rim is deficient, inferior rim short 5-6mm. Pt was evaluated by structural heart clinic at [place] (Dr. Elier Pizano) on 2/28/24 where he was deemed to likely require surgical correction given 2 large ASD with deficient anterior and inferior rims. However, pt requested second opinion and thus followed up with Dr. Nicole. After reviewing imaging, pt was referred to structural heart team/Dr. Hicks for ASD repair.   Cardiac Cath 11/14/2024 Normal coronaries.  All pressures are in mmHg. RA 6 RV 48/3/12 PA 29/13; mPAP 20; PA sat 86.8% PCWP 6 LESLEY CO: 10.72 L/min LESLEY CI:6.13 L/min/m2SAT: IVC 91 %. HRA 83.4 %. SVC 81.3%. LRA 90.4% RA86% RV 89.7%. Qp:QS 1.6  Seen in [facility] on 2/24/25 for initial consultation at which time patient reported that he was diagnosed with his ASD approximately two years ago during routine physical. 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 During the visit, the patient reported feeling well but has had increased fatigability over the course of a year which he attributed to his moods and his back pain. Reported that he had chest pain with SOB episode 2 months ago, went to the ED with unrevealing work up. Since he continues to have intermittent stabbing chest pain, self-limiting, goes away by itself after 5-10 minutes. Able to walk 45 minutes without issues. Noted some lower extremity edema when walking a lot but better in the am. Echocardiogram on the day of visit showed normal BiV function, mildly enlarged RV, no significant valvular disease, secundum ASD with bi-directional, but predominantly left to right shunting is noted and given RV remodeling, ASD closure was recommended. Dr. Hicks recommended obtaining MICHAEL images from [place], and Cardiac CTA. As it turns out, patient never had cardiac MRI done at [place].   2/24/25 labs:  WBC 7.20 H/H 16.3/48.1 Plt 202 BUN/Cr 10/0.59 ProBNP 49  CTA Cardiac 2/24/25 Cardiac: 1.  Please note this study was not optimized for the evaluation of the coronary arteries. 2.  Nonobstructive coronary artery disease. 3.  Secundum type atrial septal defect. 4.  No anomalous pulmonary venous return.  Today, patient reports feeling well, lives with his brother in an apartment. Continues to have back pain, waiting for another round of injection for pain control. No longer experiencing chest pain but continues to have intermittent shortness of breath when he gets tired. Still able to walk a lot with no issue. Stairs is challenging because of bilateral LE weakness that is related from his accident. Continues to have dependent BLE edema. Denies any recent chest pain, worsening shortness of breath, palpitations, lightheadedness/dizziness or syncope, orthopnea, PND, LE edema, abdominal bloating, fever, chills, dysuria, active dental issue, or recent hospitalizations.

## 2025-04-22 NOTE — REVIEW OF SYSTEMS
[Feeling Fatigued] : feeling fatigued [Joint Pain] : joint pain [Negative] : Heme/Lymph [Fever] : no fever [Headache] : no headache [Chills] : no chills [FreeTextEntry5] : see HPI  [de-identified] : see HPI

## 2025-04-22 NOTE — REASON FOR VISIT
[Structural Heart and Valve Disease] : structural heart and valve disease [Time Spent: ____ minutes] : Total time spent using  services: [unfilled] minutes. The patient's primary language is not English thus required  services. [Interpreters_IDNumber] : [id number] [Interpreters_FullName] : Jennifer [TWNoteComboBox1] : Czech

## 2025-05-02 NOTE — COUNSELING
[Hygeine (Including Daily Shower)] : hygeine (including daily shower) [Importance of Regular Medical Follow-Up] : the importance of regular medical follow-up [No Heavy Lifting] : no heavy lifting (>15-20 lb. for 1 month or 25 lb. for 3 months from date of surgery) [Blood Pressure Control] : blood pressure control [S/S of infection] : signs and symptoms of infection (and to whom it should be reported) [Progressive Ambulation/Activity] : progressive ambulation/activity [Low Fat/Low Cholesterol Diet] : low fat/low cholesterol diet

## 2025-05-02 NOTE — ASSESSMENT
[FreeTextEntry1] : Patient recovering well at home s/p ASD closure. Reviewed all medications and dosages with patient understanding. Patient has all medications in home and is taking as prescribed. Pain controlled with current medication regimen. No new symptoms, issues or concerns. Reports ambulating around home independently, without the use if assistive device. Denies chest pain, SOB/HU, nausea/vomiting, constipation/diarrhea.   PLAN OF CARE -START Fluoxetine and Seroquel today as discussed, call immediately for worsening symptoms or sudden changes in mood or behavior.  -Shower let water run over incision use antibacterial soap and pat dry; avoid all creams, ointments or lotions leave open to air.   -Encourage increased ambulation to include outdoors; avoiding extreme temperatures.  -Start daily weights today; call immediately for weight gain >3lbs in a day/5lbs in a week.  [facility] team will continue to follow up with patient's status. NP/CCC roles explained with patient understanding, contact information provided. Patient agrees to call with any questions, issues or concerns. Worsening symptoms reviewed with patient with reiteration and understanding.

## 2025-05-02 NOTE — REASON FOR VISIT
[Follow-Up: _____] : a [unfilled] follow-up visit [Language Line ] : provided by Language Line   [Interpreters_IDNumber] : [id number] [TWNoteComboBox1] : Slovenian

## 2025-05-02 NOTE — PHYSICAL EXAM
[] : no respiratory distress [Respiration, Rhythm And Depth] : normal respiratory rhythm and effort [Exaggerated Use Of Accessory Muscles For Inspiration] : no accessory muscle use [Oriented To Time, Place, And Person] : oriented to person, place, and time [Impaired Insight] : insight and judgment were intact [Affect] : the affect was normal [FreeTextEntry1] : right mini thoracotomy incision margins well approximated no drainage noted.

## 2025-05-02 NOTE — REASON FOR VISIT
[de-identified] : Robotic, ASD closure w/patch [de-identified] : 4/28/25 [de-identified] : Intraop uncomplicated. Extubated in OR. Postop course uneventful and pt discharged home on 5/1. chest xray XXX

## 2025-05-02 NOTE — HISTORY OF PRESENT ILLNESS
[FreeTextEntry1] : [facility]: [facility] 24-hour post discharge follow-up  Operation Date: 4/28 RA ASD closure EF nml: Primary Surgeon/Attending MD: Dr. Connelly   24 y/o M pmhx of anxiety/depression and chronic b/l leg pain/numbness 2/2 trauma (followed by pain management) presents with secundum ASD s/p RA ASD closure on 4/28. No blood or blood products. Arrived to the ICU extubated on no gtts. Discharged to home on 5/1/2025.  Seen by [facility], he is in NAD ambulating independently, he reports some difficulty sleeping overnight, states he had "lucid dreams" he has not been compliant with anti-depressant medications, will resume his medications today as discussed. Denies SOB/HU, chest pain, nausea/vomiting. He has been ambulating independently, no SOB/HU noted, he is recovering well. Denies suicidal or homicidal ideations.

## 2025-05-14 NOTE — END OF VISIT
[FreeTextEntry3] : I, Dr. BONE [facility], personally performed the evaluation and management (E/M) services for this established patient who presents today with (a) new problem(s)/exacerbation of (an) existing condition(s).  That E/M includes conducting the clinically appropriate interval history &/or exam, assessing all new/exacerbated conditions, and establishing a new plan of care.  Today, my KUNAL, was here to observe &/or participate in the visit & follow plan of care established by me.

## 2025-05-14 NOTE — REASON FOR VISIT
[de-identified] : Robotic, ASD closure w/patch [de-identified] : 4/28/25 [de-identified] : Intraop uncomplicated. Extubated in OR. Postop course uneventful and pt discharged home on 5/1. Patient presents for post op visit. He appears generally well, denies c/o chest pain, sob/betancourt, fever, lower extremity edema or palpitations. He reports incisional soreness. chest xray today w/trace right effusion

## 2025-05-14 NOTE — DISCUSSION/SUMMARY
[Doing Well] : is doing well [1] : 1 [FreeTextEntry1] : Plan:  Continue current medication regimen. Continue to increase activity and walk daily as tolerated. Continue to use incentive spirometer.  No driving or strenuous activity for 4 weeks after surgery. Avoid lifting >10 to 15 lbs. Call MD if you experience fever, fatigue, dizziness, confusion, syncope, shortness of breath, chest pain not relieved with analgesics, increased redness/drainage from incision. Follow up with Dr. Nicole stop metoprolol and protonix continue ecotrin 81mg daily DC from CTS service

## 2025-05-14 NOTE — END OF VISIT
[FreeTextEntry3] : I, Dr. BONE [facility], personally performed the evaluation and management (E/M) services for this established patient who presents today with (a) new problem(s)/exacerbation of (an) existing condition(s).  That E/M includes conducting the clinically appropriate interval history &/or exam, assessing all new/exacerbated conditions, and establishing a new plan of care.  Today, my KUNAL, was here to observe &/or participate in the visit & follow plan of care established by me.

## 2025-05-14 NOTE — PHYSICAL EXAM
[] : no respiratory distress [Auscultation Breath Sounds / Voice Sounds] : lungs were clear to auscultation bilaterally [Heart Rate And Rhythm] : heart rate was normal and rhythm regular [Heart Sounds] : normal S1 and S2 [Heart Sounds Gallop] : no gallops [Murmurs] : no murmurs [Heart Sounds Pericardial Friction Rub] : no pericardial rub [Clean] : clean [Dry] : dry [Healing Well] : healing well [No Edema] : no edema [FreeTextEntry2] : right chest port sites [FreeTextEntry3] : right femoral cannulation site

## 2025-05-14 NOTE — REASON FOR VISIT
[de-identified] : Robotic, ASD closure w/patch [de-identified] : 4/28/25 [de-identified] : Intraop uncomplicated. Extubated in OR. Postop course uneventful and pt discharged home on 5/1. Patient presents for post op visit. He appears generally well, denies c/o chest pain, sob/betancourt, fever, lower extremity edema or palpitations. He reports incisional soreness. chest xray today w/trace right effusion